# Patient Record
Sex: MALE | Race: WHITE | Employment: STUDENT | URBAN - METROPOLITAN AREA
[De-identification: names, ages, dates, MRNs, and addresses within clinical notes are randomized per-mention and may not be internally consistent; named-entity substitution may affect disease eponyms.]

---
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
## 2021-08-07 ENCOUNTER — APPOINTMENT (OUTPATIENT)
Dept: GENERAL RADIOLOGY | Age: 21
End: 2021-08-07
Attending: EMERGENCY MEDICINE
Payer: COMMERCIAL

## 2021-08-07 ENCOUNTER — HOSPITAL ENCOUNTER (EMERGENCY)
Age: 21
Discharge: HOME | End: 2021-08-07
Attending: EMERGENCY MEDICINE
Payer: COMMERCIAL

## 2021-08-07 VITALS
OXYGEN SATURATION: 96 % | BODY MASS INDEX: 28.29 KG/M2 | WEIGHT: 191 LBS | RESPIRATION RATE: 15 BRPM | HEIGHT: 69 IN | DIASTOLIC BLOOD PRESSURE: 71 MMHG | TEMPERATURE: 99 F | SYSTOLIC BLOOD PRESSURE: 144 MMHG | HEART RATE: 77 BPM

## 2021-08-07 DIAGNOSIS — T78.40XA ALLERGIC REACTION, INITIAL ENCOUNTER: ICD-10-CM

## 2021-08-07 DIAGNOSIS — J02.9 ACUTE VIRAL PHARYNGITIS: Primary | ICD-10-CM

## 2021-08-07 DIAGNOSIS — T78.3XXA ANGIOEDEMA, INITIAL ENCOUNTER: ICD-10-CM

## 2021-08-07 PROCEDURE — 87430 STREP A AG IA: CPT | Performed by: EMERGENCY MEDICINE

## 2021-08-07 PROCEDURE — 94640 AIRWAY INHALATION TREATMENT: CPT

## 2021-08-07 PROCEDURE — 96375 TX/PRO/DX INJ NEW DRUG ADDON: CPT

## 2021-08-07 PROCEDURE — 71045 X-RAY EXAM CHEST 1 VIEW: CPT | Performed by: EMERGENCY MEDICINE

## 2021-08-07 PROCEDURE — 99284 EMERGENCY DEPT VISIT MOD MDM: CPT

## 2021-08-07 PROCEDURE — 96374 THER/PROPH/DIAG INJ IV PUSH: CPT

## 2021-08-07 PROCEDURE — S0028 INJECTION, FAMOTIDINE, 20 MG: HCPCS | Performed by: EMERGENCY MEDICINE

## 2021-08-07 PROCEDURE — 94664 DEMO&/EVAL PT USE INHALER: CPT

## 2021-08-07 RX ORDER — ALBUTEROL SULFATE 90 UG/1
4 AEROSOL, METERED RESPIRATORY (INHALATION) ONCE
Status: COMPLETED | OUTPATIENT
Start: 2021-08-07 | End: 2021-08-07

## 2021-08-07 RX ORDER — PREDNISONE 20 MG/1
60 TABLET ORAL DAILY
Qty: 15 TABLET | Refills: 0 | Status: SHIPPED | OUTPATIENT
Start: 2021-08-07 | End: 2021-08-12

## 2021-08-07 RX ORDER — FAMOTIDINE 10 MG/ML
20 INJECTION, SOLUTION INTRAVENOUS ONCE
Status: COMPLETED | OUTPATIENT
Start: 2021-08-07 | End: 2021-08-07

## 2021-08-07 RX ORDER — METHYLPREDNISOLONE SODIUM SUCCINATE 125 MG/2ML
125 INJECTION, POWDER, LYOPHILIZED, FOR SOLUTION INTRAMUSCULAR; INTRAVENOUS ONCE
Status: COMPLETED | OUTPATIENT
Start: 2021-08-07 | End: 2021-08-07

## 2021-08-07 RX ORDER — EPINEPHRINE 0.3 MG/.3ML
0.3 INJECTION SUBCUTANEOUS
Qty: 1 EACH | Refills: 0 | Status: SHIPPED | OUTPATIENT
Start: 2021-08-07 | End: 2021-09-06

## 2021-08-07 RX ORDER — DIPHENHYDRAMINE HYDROCHLORIDE 50 MG/ML
25 INJECTION INTRAMUSCULAR; INTRAVENOUS ONCE
Status: COMPLETED | OUTPATIENT
Start: 2021-08-07 | End: 2021-08-07

## 2021-08-07 RX ORDER — FAMOTIDINE 20 MG/1
20 TABLET ORAL 2 TIMES DAILY PRN
Qty: 30 TABLET | Refills: 0 | Status: SHIPPED | OUTPATIENT
Start: 2021-08-07 | End: 2021-09-06

## 2021-08-07 NOTE — ED INITIAL ASSESSMENT (HPI)
C/o mild sore throat, cough symptoms 1 week ago - mon/tues last week worse - negative covid Wednesday, inhaler/steroids given, today getting worse - now having swelling to left eye, lips swelling and increased diff in breathing

## 2021-08-07 NOTE — ED PROVIDER NOTES
Patient Seen in: [facility] In [facility]      History   Patient presents with:  Difficulty Breathing    Stated Complaint: lucy     HPI/Subjective:   HPI    The patient is a 78-year-old male who states that he had little bit of cough but 1100 None (Room air)       Current:/71   Pulse 77   Temp 98.7 °F (37.1 °C) (Oral)   Resp 15   Ht 175.3 cm (5' 9\")   Wt 86.6 kg   SpO2 96%   BMI 28.21 kg/m²         Physical Exam  General: .   The patient is a well-developed male in no respiratory dis sneezing, sore throat, headache, nausea and diarrhea about 1 week ago. He was given steroid and an inhaler which he has been using since 8/4/21.   Over the past 2 days, he developed eye redness and swelling, increased difficulty in breathing and lip swelli swelling  Disposition:  Discharge  8/7/2021 12:31 pm    Follow-up:  Kristal Crook MD  [address] [place] [address]  [phone]                Medications Prescribed:  Discharge Medication List as of 8/7/2021 12:35 PM    START taking these med

## 2022-04-06 ENCOUNTER — TELEMEDICINE (OUTPATIENT)
Dept: TELEHEALTH | Age: 22
End: 2022-04-06

## 2022-04-06 DIAGNOSIS — K12.0 CANKER SORES ORAL: Primary | ICD-10-CM

## 2022-04-06 PROCEDURE — 99213 OFFICE O/P EST LOW 20 MIN: CPT | Performed by: NURSE PRACTITIONER

## 2022-04-06 RX ORDER — VALACYCLOVIR HYDROCHLORIDE 1 G/1
1000 TABLET, FILM COATED ORAL EVERY 12 HOURS SCHEDULED
Qty: 6 TABLET | Refills: 0 | Status: SHIPPED | OUTPATIENT
Start: 2022-04-06 | End: 2022-04-09

## 2022-04-06 RX ORDER — LIDOCAINE HYDROCHLORIDE 20 MG/ML
5 SOLUTION OROPHARYNGEAL
Qty: 100 ML | Refills: 0 | Status: SHIPPED | OUTPATIENT
Start: 2022-04-06 | End: 2022-04-11

## 2022-04-11 ENCOUNTER — TELEMEDICINE (OUTPATIENT)
Dept: TELEHEALTH | Age: 22
End: 2022-04-11

## 2022-04-11 DIAGNOSIS — K12.1 STOMATITIS: Primary | ICD-10-CM

## 2022-04-11 DIAGNOSIS — K12.0 CANKER SORES ORAL: ICD-10-CM

## 2022-04-11 PROCEDURE — 99213 OFFICE O/P EST LOW 20 MIN: CPT | Performed by: PHYSICIAN ASSISTANT

## 2022-04-11 RX ORDER — LIDOCAINE HYDROCHLORIDE 20 MG/ML
5 SOLUTION OROPHARYNGEAL
Qty: 100 ML | Refills: 0 | Status: SHIPPED | OUTPATIENT
Start: 2022-04-11

## 2022-04-11 RX ORDER — TRIAMCINOLONE ACETONIDE 0.1 %
1 PASTE (GRAM) DENTAL NIGHTLY
Qty: 5 G | Refills: 0 | Status: SHIPPED | OUTPATIENT
Start: 2022-04-11 | End: 2022-04-16

## 2022-06-06 ENCOUNTER — TELEMEDICINE (OUTPATIENT)
Dept: TELEHEALTH | Age: 22
End: 2022-06-06

## 2022-06-06 DIAGNOSIS — H10.021 OTHER MUCOPURULENT CONJUNCTIVITIS OF RIGHT EYE: Primary | ICD-10-CM

## 2022-06-06 RX ORDER — TOBRAMYCIN 3 MG/ML
1 SOLUTION/ DROPS OPHTHALMIC EVERY 6 HOURS
Qty: 5 ML | Refills: 0 | Status: SHIPPED | OUTPATIENT
Start: 2022-06-06 | End: 2022-06-13

## 2022-09-06 ENCOUNTER — TELEMEDICINE (OUTPATIENT)
Dept: TELEHEALTH | Age: 22
End: 2022-09-06

## 2022-09-06 DIAGNOSIS — K12.0 RECURRENT APHTHOUS STOMATITIS: Primary | ICD-10-CM

## 2022-09-06 PROCEDURE — 99213 OFFICE O/P EST LOW 20 MIN: CPT | Performed by: NURSE PRACTITIONER

## 2022-09-06 RX ORDER — LIDOCAINE HYDROCHLORIDE 20 MG/ML
5 SOLUTION OROPHARYNGEAL
Qty: 100 ML | Refills: 0 | Status: SHIPPED | OUTPATIENT
Start: 2022-09-06 | End: 2022-09-11

## 2022-09-06 RX ORDER — VALACYCLOVIR HYDROCHLORIDE 1 G/1
1000 TABLET, FILM COATED ORAL EVERY 12 HOURS SCHEDULED
Qty: 6 TABLET | Refills: 0 | Status: SHIPPED | OUTPATIENT
Start: 2022-09-06 | End: 2022-09-09

## 2022-09-06 RX ORDER — VALACYCLOVIR HYDROCHLORIDE 500 MG/1
500 TABLET, FILM COATED ORAL 2 TIMES DAILY
COMMUNITY
Start: 2022-04-18

## 2022-09-11 ENCOUNTER — TELEMEDICINE (OUTPATIENT)
Dept: TELEHEALTH | Age: 22
End: 2022-09-11

## 2022-09-11 DIAGNOSIS — K12.0 RECURRENT APHTHOUS STOMATITIS: ICD-10-CM

## 2022-09-11 PROCEDURE — 99213 OFFICE O/P EST LOW 20 MIN: CPT | Performed by: PHYSICIAN ASSISTANT

## 2022-09-11 RX ORDER — LIDOCAINE HYDROCHLORIDE 20 MG/ML
5 SOLUTION OROPHARYNGEAL
Qty: 100 ML | Refills: 0 | Status: SHIPPED | OUTPATIENT
Start: 2022-09-11

## 2022-09-11 RX ORDER — TRIAMCINOLONE ACETONIDE 0.1 %
1 PASTE (GRAM) DENTAL 2 TIMES DAILY PRN
Qty: 5 G | Refills: 0 | Status: SHIPPED | OUTPATIENT
Start: 2022-09-11 | End: 2022-09-18

## 2023-06-26 ENCOUNTER — TELEMEDICINE (OUTPATIENT)
Dept: TELEHEALTH | Age: 23
End: 2023-06-26

## 2023-06-26 DIAGNOSIS — K12.0 RECURRENT APHTHOUS STOMATITIS: Primary | ICD-10-CM

## 2023-06-26 RX ORDER — LIDOCAINE HYDROCHLORIDE 20 MG/ML
5 SOLUTION OROPHARYNGEAL
Qty: 100 ML | Refills: 0 | Status: SHIPPED | OUTPATIENT
Start: 2023-06-26

## 2023-06-26 RX ORDER — VALACYCLOVIR HYDROCHLORIDE 1 G/1
1000 TABLET, FILM COATED ORAL EVERY 12 HOURS SCHEDULED
Qty: 14 TABLET | Refills: 0 | Status: SHIPPED | OUTPATIENT
Start: 2023-06-26 | End: 2023-07-03

## 2023-06-26 NOTE — PROGRESS NOTES
Virtual/Telephone Check-In    James Nelson verbally consents to a Virtual/Telephone Check-In service on 06/26/23. Patient has been referred to the [facility] website at [email or web address] to review the yearly Consent to Treat document. Patient understands and accepts financial responsibility for any deductible, co-insurance and/or co-pays associated with this service. Telehealth Verbal Consent   I conducted a telehealth visit with James Nelson today, 06/26/23, which was completed using two-way, real-time interactive audio and video communication. This has been done in good jairo to provide continuity of care in the best interest of the provider-patient relationship, due to the COVID -19 public health crisis/national emergency where restrictions of face-to-face office visits are ongoing. Every conscious effort was taken to allow for sufficient and adequate time to complete the visit. The patient was made aware of the limitations of the telehealth visit, including treatment limitations as no physical exam could be performed. The patient was advised to call 911 or to go to the ER in case there was an emergency. The patient was also advised of the potential privacy & security concerns related to the telehealth platform. The patient was made aware of where to find [facility] notice of privacy practices, telehealth consent form and other related consent forms and documents. which are located on the [facility] website. The patient verbally agreed to telehealth consent form, related consents and the risks discussed. Lastly, the patient confirmed that they were in [place]. Included in this visit, time may have been spent reviewing labs, medications, radiology tests and decision making. Appropriate medical decision-making and tests are ordered as detailed in the plan of care above. Coding/billing information is submitted for this visit based on complexity of care and/or time spent for the visit.     CHIEF COMPLAINT:  Patient presents with:  Apthous Ulcerations      HPI:  Cindy Zuñiga is a 20year old male who presents for a video visit. Patient reports recurrent canker sores that began in the last few days. Last flare up was 9 months ago. Was on a course of high dose Valtrex during the flare up and then was on maintenance dose for 6 months. Currently states he feels lesions starting to form on inside of lower lip, tip of tongue and to gumline. Has a mild sore throat as well. Patient denies fever, chills, headache, malaise, URI symptoms. Patient has tried mouthwash for symptoms, which has not seemed to help. Current Outpatient Medications   Medication Sig Dispense Refill    Lidocaine Viscous HCl 2 % Mouth/Throat Solution Take 5 mL by mouth every 3 (three) hours as needed for Pain. 100 mL 0    valACYclovir 1 G Oral Tab Take 1 tablet (1,000 mg total) by mouth every 12 (twelve) hours for 7 days. 14 tablet 0    Lidocaine Viscous HCl 2 % Mouth/Throat Solution Take 5 mL by mouth every 3 (three) hours as needed for Pain. Swish/gargle and spit 100 mL 0    valACYclovir 500 MG Oral Tab Take 500 mg by mouth 2 (two) times daily. DOXYCYCLINE HYCLATE 100 MG Oral Tab TAKE 1 TABLET(100 MG) BY MOUTH TWICE DAILY WITH FOOD (Patient not taking: No sig reported) 60 tablet 2    finasteride 1 MG Oral Tab One tab daily 30 tablet 11    Sulfacetamide Sodium-Sulfur 8-4 % External Suspension Apply 1 Application topically 2 (two) times daily.  (Patient not taking: Reported on 9/6/2022) 473 mL 3     Past Medical History:   Diagnosis Date    Acne     ALLERGIC RHINITIS     ASTHMA     OTHER DISEASES     Fevers     Past Surgical History:   Procedure Laterality Date    HERNIA SURGERY      bilateral hernia repair    OTHER SURGICAL HISTORY      hypospadius repair       Social History     Socioeconomic History    Marital status: Single   Tobacco Use    Smoking status: Never    Smokeless tobacco: Never   Substance and Sexual Activity Alcohol use: No    Drug use: No        REVIEW OF SYSTEMS:  GENERAL: ok appetite  SKIN: no rashes or abnormal skin lesions  HEENT: See HPI  LUNGS: denies shortness of breath or wheezing, See HPI  CARDIOVASCULAR: denies chest pain or palpitations   GI: denies N/V/C or abdominal pain  NEURO: Denies headaches    EXAM:  General: Alert, Well-appearing, and In no acute distress  Respiratory:   Speaking in full sentences comfortably  Normal work of breathing  No cough during visit  Head: Normocephalic  Nose: No obvious nasal discharge. Skin: No obvious rashes or lesions from what observed. Mouth: difficult to visualize but some red erythematous lesions noted to inside of lower lip. Pt reports some to R lower gumline as well and to tongue    No results found for this or any previous visit (from the past 24 hour(s)). ASSESSMENT AND PLAN:  Delmy Mendez is a 20year old male who presents with symptoms that are consistent with    ASSESSMENT:   Recurrent aphthous stomatitis  (primary encounter diagnosis)    PLAN: Meds as below. Advised to f/u with PCP for maintenance dose of valacyclovir. Meds & Refills for this Visit:  Requested Prescriptions     Signed Prescriptions Disp Refills    Lidocaine Viscous HCl 2 % Mouth/Throat Solution 100 mL 0     Sig: Take 5 mL by mouth every 3 (three) hours as needed for Pain. valACYclovir 1 G Oral Tab 14 tablet 0     Sig: Take 1 tablet (1,000 mg total) by mouth every 12 (twelve) hours for 7 days. Risks, benefits, and side effects of medication explained and discussed. There are no Patient Instructions on file for this visit. The patient indicates understanding of these issues and agrees to the plan. Delmy Mendez understands video visit evaluation is not a substitute for face-to-face examination or emergency care. Patient advised to go to ER or call 911 for worsening symptoms or acute distress.

## 2023-10-04 ENCOUNTER — TELEMEDICINE (OUTPATIENT)
Dept: TELEHEALTH | Age: 23
End: 2023-10-04
Payer: COMMERCIAL

## 2023-10-04 DIAGNOSIS — Z87.19 HISTORY OF ORAL APHTHOUS ULCERS: Primary | ICD-10-CM

## 2023-10-04 PROCEDURE — 99213 OFFICE O/P EST LOW 20 MIN: CPT | Performed by: NURSE PRACTITIONER

## 2023-10-04 RX ORDER — VALACYCLOVIR HYDROCHLORIDE 1 G/1
1000 TABLET, FILM COATED ORAL EVERY 12 HOURS SCHEDULED
Qty: 14 TABLET | Refills: 0 | Status: SHIPPED | OUTPATIENT
Start: 2023-10-04 | End: 2023-10-11

## 2023-10-04 RX ORDER — LIDOCAINE HYDROCHLORIDE 20 MG/ML
SOLUTION OROPHARYNGEAL
Qty: 100 ML | Refills: 2 | Status: SHIPPED | OUTPATIENT
Start: 2023-10-04

## 2023-10-04 NOTE — PROGRESS NOTES
CHIEF COMPLAINT:     Stomatitis    HPI:   Christiano Ann is a 20year old male. Patient presents via Video Visit on Demand. Patient consents to conducting the visit virtually and acknowledges limitations without an in person examination. Patients states that he has had a long history of recurrent Stomatitis, also described as Aphthous ulcers by his providers. Patient has had ENT consultations regarding this per the patient. Patient states that he feels well, but can feel the sores developing in his mouth. Patient is questing a refill of Valacyclovir and Viscous Xylocaine rinse. Denies ever having complications associated with this. Current Outpatient Medications   Medication Sig Dispense Refill    valACYclovir 1 G Oral Tab Take 1 tablet (1,000 mg total) by mouth every 12 (twelve) hours for 7 days. 14 tablet 0    Lidocaine Viscous HCl 2 % Mouth/Throat Solution Rinse orally with 5 ml every 3 hours and spit out solution. 100 mL 2    DOXYCYCLINE HYCLATE 100 MG Oral Tab TAKE 1 TABLET(100 MG) BY MOUTH TWICE DAILY WITH FOOD (Patient not taking: No sig reported) 60 tablet 2    finasteride 1 MG Oral Tab One tab daily 30 tablet 11    Sulfacetamide Sodium-Sulfur 8-4 % External Suspension Apply 1 Application topically 2 (two) times daily.  (Patient not taking: Reported on 9/6/2022) 473 mL 3      Past Medical History:   Diagnosis Date    Acne     ALLERGIC RHINITIS     ASTHMA     OTHER DISEASES     Fevers      Social History:  Social History     Socioeconomic History    Marital status: Single   Tobacco Use    Smoking status: Never    Smokeless tobacco: Never   Substance and Sexual Activity    Alcohol use: No    Drug use: No        REVIEW OF SYSTEMS:   GENERAL HEALTH: Normal appetite  SKIN: No rashes or lesions other that oral  HEENT: See HPI  RESPIRATORY: no c/o shortness of breath or wheezing  CARDIOVASCULAR: no c/o chest pain or palpitations   GI: no c/o vomiting or diarrhea  NEURO: no c/o dizziness or lightheadedness    EXAM:     Video Visit on Demand    GENERAL: well developed, well nourished,in no apparent distress. Patient is very pleasant and is able to provide an excellent HPI and ROS. Did not ask patient to display any of the lesions via the Telehealth Visit. ASSESSMENT AND PLAN:   Assessment:   History of oral aphthous ulcers  (primary encounter diagnosis)    Plan:     - valACYclovir 1 G Oral Tab; Take 1 tablet (1,000 mg total) by mouth every 12 (twelve) hours for 7 days. Dispense: 14 tablet; Refill: 0  - Lidocaine Viscous HCl 2 % Mouth/Throat Solution; Rinse orally with 5 ml every 3 hours and spit out solution. Dispense: 100 mL; Refill: 2  Advised patient to follow up with Dr. Ceci Dodson if not improving with each day. Consent given by patient to conduct the Video Visit. Approx 5-10 minutes spend in direct patient evaluation.

## 2023-10-23 ENCOUNTER — TELEMEDICINE (OUTPATIENT)
Dept: TELEHEALTH | Age: 23
End: 2023-10-23
Payer: COMMERCIAL

## 2023-10-23 DIAGNOSIS — K12.0 RECURRENT APHTHOUS STOMATITIS: Primary | ICD-10-CM

## 2023-10-23 NOTE — PROGRESS NOTES
Telehealth Verbal Consent   I conducted a telehealth visit with Otilia Cervantes today, 10/23/23, which was completed using two-way, real-time interactive audio and video communication. This has been done in good jairo to provide continuity of care in the best interest of the provider-patient relationship, due to the COVID -19 public health crisis/national emergency where restrictions of face-to-face office visits are ongoing. Every conscious effort was taken to allow for sufficient and adequate time to complete the visit. The patient was made aware of the limitations of the telehealth visit, including treatment limitations as no physical exam could be performed. The patient was advised to call 911 or to go to the ER in case there was an emergency. The patient was also advised of the potential privacy & security concerns related to the telehealth platform. The patient was made aware of where to find [facility] notice of privacy practices, telehealth consent form and other related consent forms and documents. which are located on the [facility] website. The patient verbally agreed to telehealth consent form, related consents and the risks discussed. Lastly, the patient confirmed that they were in [place]. Included in this visit, time may have been spent reviewing labs, medications, radiology tests and decision making. Appropriate medical decision-making and tests are ordered as detailed in the plan of care above. Coding/billing information is submitted for this visit based on complexity of care and/or time spent for the visit. CHIEF COMPLAINT:   No chief complaint on file. HPI:   Otilia Cervantes is a 20year old male who presents for a video visit. Patient reports he has recurrent ulcerations to mouth, tongue, and throat. He is on valtrex daily and then receives a higher dose during an outbreak. Patient is seeking a higher dose of valtrex today.   Patient was just treated with a higher dose of valtrex 3 weeks ago via telehealth and symptoms did not improve. Current Outpatient Medications   Medication Sig Dispense Refill    Lidocaine Viscous HCl 2 % Mouth/Throat Solution Rinse orally with 5 ml every 3 hours and spit out solution. 100 mL 2    DOXYCYCLINE HYCLATE 100 MG Oral Tab TAKE 1 TABLET(100 MG) BY MOUTH TWICE DAILY WITH FOOD (Patient not taking: No sig reported) 60 tablet 2    finasteride 1 MG Oral Tab One tab daily 30 tablet 11    Sulfacetamide Sodium-Sulfur 8-4 % External Suspension Apply 1 Application topically 2 (two) times daily. (Patient not taking: Reported on 9/6/2022) 473 mL 3      Past Medical History:   Diagnosis Date    Acne     ALLERGIC RHINITIS     ASTHMA     OTHER DISEASES     Fevers      Past Surgical History:   Procedure Laterality Date    HERNIA SURGERY      bilateral hernia repair    OTHER SURGICAL HISTORY      hypospadius repair         Social History     Socioeconomic History    Marital status: Single   Tobacco Use    Smoking status: Never    Smokeless tobacco: Never   Substance and Sexual Activity    Alcohol use: No    Drug use: No         REVIEW OF SYSTEMS:   GENERAL: Normal appetite  SKIN: no rashes or abnormal skin lesions  HEENT: See HPI  LUNGS: denies shortness of breath or wheezing, See HPI  CARDIOVASCULAR: denies chest pain or palpitations   GI: denies N/V/C or abdominal pain  NEURO: Denies headaches    EXAM:   General: Alert  Respiratory:   Speaking in full sentences comfortably  Normal work of breathing  No cough during visit  Head: Normocephalic  Nose: No obvious nasal discharge. Skin: No obvious rashes or lesions from what observed. No results found for this or any previous visit (from the past 24 hour(s)). ASSESSMENT AND PLAN:   Best Salgado is a 20year old male who presents with symptoms that are consistent with    ASSESSMENT:   Recurrent aphthous stomatitis  (primary encounter diagnosis)    PLAN: Meds as below.   See patient Instructions.  -Discussed patient needs to call his PCP or specialist.  Patient was already treated 3 weeks ago with higher dose valtrex and symptoms did not improve. If unable to be seen my PCP today advised may be seen at one of our IC locations for an exam.  No Charge. Meds & Refills for this Visit:  Requested Prescriptions      No prescriptions requested or ordered in this encounter       Risks, benefits, and side effects of medication explained and discussed. There are no Patient Instructions on file for this visit. The patient indicates understanding of these issues and agrees to the plan. The patient is asked to return if sx's persist or worsen. Anuja Isaac understands video visit evaluation is not a substitute for face-to-face examination or emergency care. Patient advised to go to ER or call 911 for worsening symptoms or acute distress.